# Patient Record
Sex: MALE | Race: WHITE | NOT HISPANIC OR LATINO | Employment: UNEMPLOYED | URBAN - METROPOLITAN AREA
[De-identification: names, ages, dates, MRNs, and addresses within clinical notes are randomized per-mention and may not be internally consistent; named-entity substitution may affect disease eponyms.]

---

## 2023-04-28 ENCOUNTER — OFFICE VISIT (OUTPATIENT)
Dept: FAMILY MEDICINE CLINIC | Facility: CLINIC | Age: 33
End: 2023-04-28
Payer: MEDICAID

## 2023-04-28 VITALS
BODY MASS INDEX: 36.05 KG/M2 | DIASTOLIC BLOOD PRESSURE: 70 MMHG | HEIGHT: 73 IN | SYSTOLIC BLOOD PRESSURE: 122 MMHG | TEMPERATURE: 98.7 F | RESPIRATION RATE: 18 BRPM | HEART RATE: 80 BPM | OXYGEN SATURATION: 98 % | WEIGHT: 272 LBS

## 2023-04-28 DIAGNOSIS — E66.09 CLASS 2 OBESITY DUE TO EXCESS CALORIES WITHOUT SERIOUS COMORBIDITY WITH BODY MASS INDEX (BMI) OF 36.0 TO 36.9 IN ADULT: ICD-10-CM

## 2023-04-28 DIAGNOSIS — F41.1 GAD (GENERALIZED ANXIETY DISORDER): Primary | ICD-10-CM

## 2023-04-28 RX ORDER — DULOXETIN HYDROCHLORIDE 30 MG/1
30 CAPSULE, DELAYED RELEASE ORAL DAILY
Qty: 30 CAPSULE | Refills: 2 | Status: SHIPPED | OUTPATIENT
Start: 2023-04-28

## 2023-04-28 RX ORDER — HYDROXYZINE HYDROCHLORIDE 25 MG/1
25 TABLET, FILM COATED ORAL 3 TIMES DAILY PRN
COMMUNITY

## 2023-04-28 RX ORDER — HYDROXYZINE PAMOATE 50 MG/1
50 CAPSULE ORAL NIGHTLY
COMMUNITY

## 2023-04-28 NOTE — PROGRESS NOTES
Chief Complaint   Patient presents with   • Establish Care   • Hepatitis C       Subjective   Thiago Alvarez is a 33 y.o. male    History of Present Illness  Patient today to establish care.  He does report back in  he tested positive for hepatitis C and due to life circumstances is not had the opportunity get treated for this.  Patient also has a long-term history of anxiety.  He currently has been taking hydroxyzine and it does not really help much.  He has taken Zoloft in the past, Cymbalta, Paxil but he did not really like the way this worked for him, and Klonopin in the past.  He is currently residing in sober living and has a history of addiction.    No Known Allergies  Past Medical History:   Diagnosis Date   • Hepatitis C       History reviewed. No pertinent surgical history.  Social History     Socioeconomic History   • Marital status: Single   Tobacco Use   • Smoking status: Former     Packs/day: 1.00     Years: 3.00     Pack years: 3.00     Types: Cigarettes     Quit date: 2022     Years since quittin.4   • Smokeless tobacco: Never   Vaping Use   • Vaping Use: Every day   • Substances: Nicotine, Flavoring   Substance and Sexual Activity   • Alcohol use: Never   • Drug use: Yes     Types: Amphetamines, Methamphetamines     Comment: sober since 2022   • Sexual activity: Defer        Current Outpatient Medications:   •  hydrOXYzine (ATARAX) 25 MG tablet, Take 1 tablet by mouth 3 (Three) Times a Day As Needed for Itching., Disp: , Rfl:   •  hydrOXYzine pamoate (VISTARIL) 50 MG capsule, Take 1 capsule by mouth Every Night., Disp: , Rfl:   •  DULoxetine (Cymbalta) 30 MG capsule, Take 1 capsule by mouth Daily., Disp: 30 capsule, Rfl: 2     Review of Systems   Constitutional: Negative for appetite change, chills, fatigue and fever.   HENT: Negative for congestion, ear pain, hearing loss, rhinorrhea, sinus pressure and sore throat.    Eyes: Negative for itching and visual disturbance.    Respiratory: Negative for cough, shortness of breath and wheezing.    Cardiovascular: Negative for chest pain, palpitations and leg swelling.   Gastrointestinal: Negative for abdominal pain, blood in stool, constipation, diarrhea, nausea and vomiting.   Endocrine: Negative for cold intolerance, heat intolerance, polydipsia, polyphagia and polyuria.   Genitourinary: Negative for difficulty urinating, dysuria and hematuria.   Musculoskeletal: Negative for arthralgias, back pain, joint swelling, myalgias and neck pain.   Skin: Negative for rash and wound.   Allergic/Immunologic: Negative for environmental allergies and food allergies.   Neurological: Negative for dizziness, light-headedness, numbness and headaches.   Hematological: Negative for adenopathy. Does not bruise/bleed easily.   Psychiatric/Behavioral: Negative for dysphoric mood and sleep disturbance. The patient is nervous/anxious.        Objective     Vitals:    04/28/23 0853   BP: 122/70   Pulse: 80   Resp: 18   Temp: 98.7 °F (37.1 °C)   SpO2: 98%         04/28/23  0853   Weight: 123 kg (272 lb)     Body mass index is 36.13 kg/m².  No results found for this or any previous visit.    Physical Exam  Vitals reviewed.   Constitutional:       Appearance: He is well-developed.   HENT:      Head: Normocephalic and atraumatic.   Cardiovascular:      Rate and Rhythm: Normal rate and regular rhythm.      Heart sounds: Normal heart sounds.   Pulmonary:      Effort: Pulmonary effort is normal.      Breath sounds: Normal breath sounds.   Skin:     General: Skin is warm and dry.   Neurological:      Mental Status: He is alert and oriented to person, place, and time.   Psychiatric:         Behavior: Behavior normal.         Assessment & Plan   Problems Addressed this Visit        Endocrine and Metabolic    Class 2 obesity due to excess calories without serious comorbidity with body mass index (BMI) of 36.0 to 36.9 in adult       Mental Health    VALENTIN (generalized  anxiety disorder) - Primary    Relevant Medications    hydrOXYzine (ATARAX) 25 MG tablet    hydrOXYzine pamoate (VISTARIL) 50 MG capsule    DULoxetine (Cymbalta) 30 MG capsule   Diagnoses       Codes Comments    VALENTIN (generalized anxiety disorder)    -  Primary ICD-10-CM: F41.1  ICD-9-CM: 300.02     Class 2 obesity due to excess calories without serious comorbidity with body mass index (BMI) of 36.0 to 36.9 in adult     ICD-10-CM: E66.09, Z68.36  ICD-9-CM: 278.00, V85.36       We will put him on duloxetine 30 mg daily. Patient was given instructions on medication prescribed, including how to take, common side effects, and when it will have full effectiveness. The patient was instructed to call my office if any side effects are noted. The patient was also instructed to stop this medication if their symptoms worsen, their mood worsens, or any desire to hurt someone or themselves. Patient was given my card and informed how to contact me if needed or call my office.     Discussed need for weight management.  I recommend they use a weight loss salma on their phone, eat no more than [id number] alondra/day, and exercise 30 to 60 minutes 3 times a week.  Discussed weight loss with diet, recommend Weight Watchers or Mediterranean Diet, but stated that whatever method works for this patient is best. The patient agrees with all recommendations at this time. I have discussed a weight loss plan to be determined by this patient.     Time spent on visit, including counseling, education, reviewing the chart, and any recent test results, was     15   Minutes    Return visit in for physical in 2 months    Counseling provided on weight management and anxiety.    Long Childress, APRN   4/28/2023   09:30 EDT     Please note that portions of this document were completed with a voice recognition program.     At [facility], we believe that sharing information builds trust and better relationships. You are receiving this note because you  are receiving care at [facility] or have recently visited. It is possible you will see health information before a provider has talked with you about it. This kind of information can be easy to misunderstand. To help you fully understand what it means for your health, we urge you to discuss this note with your provider.